# Patient Record
Sex: FEMALE | Race: WHITE | NOT HISPANIC OR LATINO
[De-identification: names, ages, dates, MRNs, and addresses within clinical notes are randomized per-mention and may not be internally consistent; named-entity substitution may affect disease eponyms.]

---

## 2018-01-01 ENCOUNTER — TRANSCRIPTION ENCOUNTER (OUTPATIENT)
Age: 63
End: 2018-01-01

## 2018-06-16 ENCOUNTER — TRANSCRIPTION ENCOUNTER (OUTPATIENT)
Age: 63
End: 2018-06-16

## 2020-10-23 ENCOUNTER — TRANSCRIPTION ENCOUNTER (OUTPATIENT)
Age: 65
End: 2020-10-23

## 2020-11-26 ENCOUNTER — TRANSCRIPTION ENCOUNTER (OUTPATIENT)
Age: 65
End: 2020-11-26

## 2020-12-07 ENCOUNTER — TRANSCRIPTION ENCOUNTER (OUTPATIENT)
Age: 65
End: 2020-12-07

## 2021-01-17 ENCOUNTER — TRANSCRIPTION ENCOUNTER (OUTPATIENT)
Age: 66
End: 2021-01-17

## 2022-06-19 ENCOUNTER — NON-APPOINTMENT (OUTPATIENT)
Age: 67
End: 2022-06-19

## 2024-03-08 ENCOUNTER — NON-APPOINTMENT (OUTPATIENT)
Age: 69
End: 2024-03-08

## 2024-03-15 PROBLEM — Z00.00 ENCOUNTER FOR PREVENTIVE HEALTH EXAMINATION: Status: ACTIVE | Noted: 2024-03-15

## 2024-03-25 ENCOUNTER — APPOINTMENT (OUTPATIENT)
Dept: RHEUMATOLOGY | Facility: CLINIC | Age: 69
End: 2024-03-25
Payer: MEDICARE

## 2024-03-25 ENCOUNTER — NON-APPOINTMENT (OUTPATIENT)
Age: 69
End: 2024-03-25

## 2024-03-25 VITALS
BODY MASS INDEX: 25.11 KG/M2 | OXYGEN SATURATION: 97 % | HEIGHT: 61 IN | DIASTOLIC BLOOD PRESSURE: 88 MMHG | HEART RATE: 89 BPM | WEIGHT: 133 LBS | SYSTOLIC BLOOD PRESSURE: 122 MMHG | TEMPERATURE: 97.3 F

## 2024-03-25 DIAGNOSIS — G89.29 PAIN IN LEFT HIP: ICD-10-CM

## 2024-03-25 DIAGNOSIS — M54.2 CERVICALGIA: ICD-10-CM

## 2024-03-25 DIAGNOSIS — M81.0 AGE-RELATED OSTEOPOROSIS W/OUT CURRENT PATHOLOGICAL FRACTURE: ICD-10-CM

## 2024-03-25 DIAGNOSIS — M25.552 PAIN IN LEFT HIP: ICD-10-CM

## 2024-03-25 DIAGNOSIS — M54.30 SCIATICA, UNSPECIFIED SIDE: ICD-10-CM

## 2024-03-25 PROCEDURE — 99204 OFFICE O/P NEW MOD 45 MIN: CPT

## 2024-03-25 RX ORDER — SERTRALINE HYDROCHLORIDE 100 MG/1
100 TABLET, FILM COATED ORAL
Refills: 0 | Status: ACTIVE | COMMUNITY

## 2024-03-25 RX ORDER — NEBIVOLOL 5 MG/1
5 TABLET ORAL
Refills: 0 | Status: ACTIVE | COMMUNITY

## 2024-03-25 RX ORDER — ROSUVASTATIN CALCIUM 5 MG/1
5 TABLET, FILM COATED ORAL
Refills: 0 | Status: ACTIVE | COMMUNITY

## 2024-03-25 RX ORDER — HYDROCHLOROTHIAZIDE 12.5 MG/1
12.5 TABLET ORAL
Refills: 0 | Status: ACTIVE | COMMUNITY

## 2024-03-25 RX ORDER — EZETIMIBE 10 MG/1
10 TABLET ORAL
Refills: 0 | Status: ACTIVE | COMMUNITY

## 2024-03-25 RX ORDER — CYANOCOBALAMIN (VITAMIN B-12) 1000 MCG
1000 TABLET ORAL
Refills: 0 | Status: ACTIVE | COMMUNITY

## 2024-03-25 RX ORDER — ALPRAZOLAM 0.25 MG/1
0.25 TABLET ORAL
Refills: 0 | Status: ACTIVE | COMMUNITY

## 2024-03-25 NOTE — PHYSICAL EXAM
[TextEntry] :   GENERAL: Appears in no acute distress HEENT: EOMI. No conjunctival erythema. Moist mucous membranes. No nasopharyngeal ulcers NECK: Supple, no cervical lymphadenopathy CARDIOVASCULAR: RRR. S1, S2 auscultated.  PULMONARY: Clear to auscultation b/l, no wheezes, rales, or crackles MSK: No active synovitis, swelling, erythema, or warmth. No joint tenderness to palpation. No deformities. No crepitus. Normal ROM of neck, back, b/l upper and lower extremities. SKIN: No lesions or rashes NEURO: No focal deficits.  PSYCH: Normal affect and thought process.

## 2024-03-25 NOTE — ASSESSMENT
[FreeTextEntry1] : 68 year old  with anxiety, HLD, HTN  Coming in for evaluation of osteoporosis and joint pain   (L1-L4 T-score -2.1 Femoral neck left T-score -2.9 femoral neck right T-score -2.8)  Patient denies any fragility fractures, cancers, radiation therapy Has heartburn for which she takes Prilosec as needed Reports  no active dental problems or planned dental surgeries . Did have a bridge in the recent past   Patient has chronic pain in her neck, left hip, as well as sciatica. Denies hand or knee or shoulder pain She reports she is very active.   -Regarding osteoporosis, she reports her PCP wanted to start her on Prolia but did not explain the side effects to her so she changed PCP who referred her to rheumatology. - I explained to patient her DEXA findings in detail and indication to start treatment for osteoporosis. Went over  side effects/benefits of  bisphosphonates ( bone pain and flu-like illness x 2-3 days, ONJ, atypical femoral fractures Advised to take on empty stomach in morning with full glass of water and to remain upright and NPO for 30-60 mins.Also, discussed oral vs IV options). I discussed benefits and side effects of denosumab with the pt in detail including ONJ, risk of rapid bone loss, vertebral fractures, atypical femoral fractures after discontinuation. -Patient at this time refuses to start medication. I explained to her that I do recommend starting medication as risk of having complications from osteoporosis  (fracture) can increase mortality risk , interfere with ADLS, and overall lifestyle. However, if she is not ready to start medication, she should continue conservative approach to improve bone health. Patient opted to monitor without starting medication. - Continue Calcium 1000 daily and Vitamin D 1000 IU daily. - Advised to increase exercise to help with osteoporosis and minimize fall risk. - Regarding hip pain, sciatica, neck pain component of OA and muscle pain. Patient advised to follow up with her PCP if worsening symptoms for imaging as needed and can do PT/see orthro as needed for worsening arthritis symptoms. At this time, she will focus on muscle strengthening exercises without high impact.   Patient will return as needed if she changes her mind about starting treatment for osteopetrosis.  All questions answered.    Total time spent in review of patient history, clinical exam, management, counseling, and plan of care:  50min

## 2024-03-25 NOTE — HISTORY OF PRESENT ILLNESS
[FreeTextEntry1] : 68 year old  with anxiety, HLD, HTN  Coming in for evaluation of osteoporosis and joint pain   (L1-L4 T-score -2.1 Femoral neck left T-score -2.9 femoral neck right T-score -2.8)  Patient denies any fragility fractures, cancers, radiation therapy Has heartburn for which she takes Prilosec as needed Reports  no active dental problems or planned dental surgeries . Did have a bridge in the recent past   Patient has chronic pain in her neck, left hip, as well as sciatica. Denies hand or knee or shoulder pain She reports she is very active.     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